# Patient Record
Sex: FEMALE | Race: WHITE
[De-identification: names, ages, dates, MRNs, and addresses within clinical notes are randomized per-mention and may not be internally consistent; named-entity substitution may affect disease eponyms.]

---

## 2021-01-15 ENCOUNTER — HOSPITAL ENCOUNTER (OUTPATIENT)
Dept: HOSPITAL 53 - M SFHCPLAZ | Age: 81
End: 2021-01-15
Attending: NURSE PRACTITIONER
Payer: COMMERCIAL

## 2021-01-15 DIAGNOSIS — E55.9: ICD-10-CM

## 2021-01-15 DIAGNOSIS — E11.9: ICD-10-CM

## 2021-01-15 DIAGNOSIS — Z13.220: ICD-10-CM

## 2021-01-15 DIAGNOSIS — I10: Primary | ICD-10-CM

## 2021-01-15 DIAGNOSIS — E03.9: ICD-10-CM

## 2021-01-15 LAB
25(OH)D3 SERPL-MCNC: 33.5 NG/ML (ref 30–100)
ALBUMIN SERPL BCG-MCNC: 3.2 GM/DL (ref 3.2–5.2)
ALT SERPL W P-5'-P-CCNC: 47 U/L (ref 12–78)
BILIRUB SERPL-MCNC: 0.4 MG/DL (ref 0.2–1)
BUN SERPL-MCNC: 17 MG/DL (ref 7–18)
CALCIUM SERPL-MCNC: 8.9 MG/DL (ref 8.8–10.2)
CHLORIDE SERPL-SCNC: 101 MEQ/L (ref 98–107)
CHOLEST SERPL-MCNC: 195 MG/DL (ref ?–200)
CHOLEST/HDLC SERPL: 3.31 {RATIO} (ref ?–5)
CO2 SERPL-SCNC: 30 MEQ/L (ref 21–32)
CREAT SERPL-MCNC: 0.69 MG/DL (ref 0.55–1.3)
CREAT UR-MCNC: 21.8 MG/DL
EST. AVERAGE GLUCOSE BLD GHB EST-MCNC: 123 MG/DL (ref 60–110)
GFR SERPL CREATININE-BSD FRML MDRD: > 60 ML/MIN/{1.73_M2} (ref 32–?)
GLUCOSE SERPL-MCNC: 99 MG/DL (ref 70–100)
HDLC SERPL-MCNC: 59 MG/DL (ref 40–?)
LDLC SERPL CALC-MCNC: 113 MG/DL (ref ?–100)
MICROALBUMIN UR-MCNC: < 5 MG/L
MICROALBUMIN/CREAT UR: 22.9 MCG/MG (ref 0–30)
NONHDLC SERPL-MCNC: 136 MG/DL
POTASSIUM SERPL-SCNC: 4.1 MEQ/L (ref 3.5–5.1)
PROT SERPL-MCNC: 7.3 GM/DL (ref 6.4–8.2)
SODIUM SERPL-SCNC: 137 MEQ/L (ref 136–145)
TRIGL SERPL-MCNC: 116 MG/DL (ref ?–150)
TSH SERPL DL<=0.005 MIU/L-ACNC: 2.61 UIU/ML (ref 0.36–3.74)

## 2021-10-22 ENCOUNTER — HOSPITAL ENCOUNTER (OUTPATIENT)
Dept: HOSPITAL 53 - M PLAIMG | Age: 81
End: 2021-10-22
Attending: NURSE PRACTITIONER
Payer: COMMERCIAL

## 2021-10-22 DIAGNOSIS — M54.50: Primary | ICD-10-CM

## 2021-10-22 LAB
25(OH)D3 SERPL-MCNC: 40.2 NG/ML (ref 30–100)
ALBUMIN SERPL BCG-MCNC: 3 GM/DL (ref 3.2–5.2)
ALT SERPL W P-5'-P-CCNC: 48 U/L (ref 12–78)
BILIRUB SERPL-MCNC: 0.3 MG/DL (ref 0.2–1)
BUN SERPL-MCNC: 21 MG/DL (ref 7–18)
CALCIUM SERPL-MCNC: 8.8 MG/DL (ref 8.8–10.2)
CHLORIDE SERPL-SCNC: 108 MEQ/L (ref 98–107)
CHOLEST SERPL-MCNC: 169 MG/DL (ref ?–200)
CHOLEST/HDLC SERPL: 2.91 {RATIO} (ref ?–5)
CO2 SERPL-SCNC: 30 MEQ/L (ref 21–32)
CREAT SERPL-MCNC: 0.98 MG/DL (ref 0.55–1.3)
EST. AVERAGE GLUCOSE BLD GHB EST-MCNC: 117 MG/DL (ref 60–110)
GFR SERPL CREATININE-BSD FRML MDRD: 58 ML/MIN/{1.73_M2} (ref 32–?)
GLUCOSE SERPL-MCNC: 115 MG/DL (ref 70–100)
HDLC SERPL-MCNC: 58 MG/DL (ref 40–?)
LDLC SERPL CALC-MCNC: 88 MG/DL (ref ?–100)
NONHDLC SERPL-MCNC: 111 MG/DL
POTASSIUM SERPL-SCNC: 4.2 MEQ/L (ref 3.5–5.1)
PROT SERPL-MCNC: 6.9 GM/DL (ref 6.4–8.2)
SODIUM SERPL-SCNC: 141 MEQ/L (ref 136–145)
TRIGL SERPL-MCNC: 116 MG/DL (ref ?–150)
TSH SERPL DL<=0.005 MIU/L-ACNC: 1.3 UIU/ML (ref 0.36–3.74)

## 2021-10-24 NOTE — REP
INDICATION:

LOW BACK PAIN.



COMPARISON:

None.



TECHNIQUE:

AP and lateral views of the lower thoracic/lumbar spine



FINDINGS:

Advanced degenerative changes at the T12-L1 level including endplate sclerosis, joint

space narrowing and bridging osteophytosis along with hypertrophic facet changes.



Moderate to advanced degenerative changes are also noted throughout the remainder of

the visualized lower thoracic and lumbosacral spine.  Findings include endplate

sclerosis, disc space narrowing, marginal osteophytosis and facet hypertrophy.



No evidence for acute fracture/compression injury.





IMPRESSION:

Moderate to advanced multilevel degenerative spondylosis.





<Electronically signed by Donovan Pascual > 10/24/21 [id number]

## 2021-12-06 ENCOUNTER — HOSPITAL ENCOUNTER (EMERGENCY)
Dept: HOSPITAL 53 - M ED | Age: 81
Discharge: HOME | End: 2021-12-06
Payer: COMMERCIAL

## 2021-12-06 VITALS — HEIGHT: 64 IN | BODY MASS INDEX: 28.53 KG/M2 | WEIGHT: 167.11 LBS

## 2021-12-06 VITALS — DIASTOLIC BLOOD PRESSURE: 100 MMHG | SYSTOLIC BLOOD PRESSURE: 190 MMHG

## 2021-12-06 DIAGNOSIS — K20.90: Primary | ICD-10-CM

## 2021-12-06 DIAGNOSIS — I10: ICD-10-CM

## 2021-12-06 DIAGNOSIS — Z86.79: ICD-10-CM

## 2021-12-06 DIAGNOSIS — Z88.5: ICD-10-CM

## 2021-12-06 DIAGNOSIS — Z82.49: ICD-10-CM

## 2021-12-06 DIAGNOSIS — E11.9: ICD-10-CM

## 2021-12-06 DIAGNOSIS — K21.9: ICD-10-CM

## 2021-12-06 DIAGNOSIS — R07.9: ICD-10-CM

## 2021-12-06 LAB
ALBUMIN SERPL BCG-MCNC: 3.6 GM/DL (ref 3.2–5.2)
ALT SERPL W P-5'-P-CCNC: 45 U/L (ref 12–78)
BASOPHILS # BLD AUTO: 0 10^3/UL (ref 0–0.2)
BASOPHILS NFR BLD AUTO: 0.3 % (ref 0–1)
BILIRUB CONJ SERPL-MCNC: 0.2 MG/DL (ref 0–0.2)
BILIRUB SERPL-MCNC: 0.4 MG/DL (ref 0.2–1)
BUN SERPL-MCNC: 16 MG/DL (ref 7–18)
CALCIUM SERPL-MCNC: 9.6 MG/DL (ref 8.8–10.2)
CHLORIDE SERPL-SCNC: 105 MEQ/L (ref 98–107)
CO2 SERPL-SCNC: 29 MEQ/L (ref 21–32)
CREAT SERPL-MCNC: 0.68 MG/DL (ref 0.55–1.3)
EOSINOPHIL # BLD AUTO: 0 10^3/UL (ref 0–0.5)
EOSINOPHIL NFR BLD AUTO: 0.6 % (ref 0–3)
GFR SERPL CREATININE-BSD FRML MDRD: > 60 ML/MIN/{1.73_M2} (ref 32–?)
GLUCOSE SERPL-MCNC: 106 MG/DL (ref 70–100)
HCT VFR BLD AUTO: 39.7 % (ref 36–47)
HGB BLD-MCNC: 12.8 G/DL (ref 12–15.5)
LIPASE SERPL-CCNC: 259 U/L (ref 73–393)
LYMPHOCYTES # BLD AUTO: 0.7 10^3/UL (ref 1.5–5)
LYMPHOCYTES NFR BLD AUTO: 18.8 % (ref 24–44)
MCH RBC QN AUTO: 30 PG (ref 27–33)
MCHC RBC AUTO-ENTMCNC: 32.2 G/DL (ref 32–36.5)
MCV RBC AUTO: 93.2 FL (ref 80–96)
MONOCYTES # BLD AUTO: 0.4 10^3/UL (ref 0–0.8)
MONOCYTES NFR BLD AUTO: 11 % (ref 2–8)
NEUTROPHILS # BLD AUTO: 2.5 10^3/UL (ref 1.5–8.5)
NEUTROPHILS NFR BLD AUTO: 68.7 % (ref 36–66)
PLATELET # BLD AUTO: 114 10^3/UL (ref 150–450)
POTASSIUM SERPL-SCNC: 3.9 MEQ/L (ref 3.5–5.1)
PROT SERPL-MCNC: 7.9 GM/DL (ref 6.4–8.2)
RBC # BLD AUTO: 4.26 10^6/UL (ref 4–5.4)
RSV RNA NPH QL NAA+PROBE: NEGATIVE
SODIUM SERPL-SCNC: 140 MEQ/L (ref 136–145)
WBC # BLD AUTO: 3.6 10^3/UL (ref 4–10)

## 2021-12-06 PROCEDURE — 71275 CT ANGIOGRAPHY CHEST: CPT

## 2021-12-06 PROCEDURE — 93005 ELECTROCARDIOGRAM TRACING: CPT

## 2021-12-06 PROCEDURE — 87631 RESP VIRUS 3-5 TARGETS: CPT

## 2021-12-06 PROCEDURE — 80076 HEPATIC FUNCTION PANEL: CPT

## 2021-12-06 PROCEDURE — 85025 COMPLETE CBC W/AUTO DIFF WBC: CPT

## 2021-12-06 PROCEDURE — 80048 BASIC METABOLIC PNL TOTAL CA: CPT

## 2021-12-06 PROCEDURE — 71045 X-RAY EXAM CHEST 1 VIEW: CPT

## 2021-12-06 PROCEDURE — 36415 COLL VENOUS BLD VENIPUNCTURE: CPT

## 2021-12-06 PROCEDURE — 99285 EMERGENCY DEPT VISIT HI MDM: CPT

## 2021-12-06 PROCEDURE — 94760 N-INVAS EAR/PLS OXIMETRY 1: CPT

## 2021-12-06 PROCEDURE — 84484 ASSAY OF TROPONIN QUANT: CPT

## 2021-12-06 PROCEDURE — 93041 RHYTHM ECG TRACING: CPT

## 2021-12-06 PROCEDURE — 83690 ASSAY OF LIPASE: CPT

## 2021-12-06 NOTE — CCD
Summarization of Episode Note

                             Created on: 09/10/2021



TAURUS JUNE

External Reference #: [id number]

: 1940

Sex: Female



Demographics





                          Address                   [address]

[place]  [address]

 

                          Home Phone                [phone]

 

                          Preferred Language        Unknown

 

                          Marital Status            Unknown

 

                          Christian Affiliation     Unknown

 

                          Race                      White

 

                          Ethnic Group              Not  or 





Author





                          Author                    [facility]

ems

 

                          Organization              [facility]

ems

 

                          Address                   Unknown

 

                          Phone                     Unavailable







Support





                Name            Relationship    Address         Phone

 

                    TAURUS JUNE                [address]

[place]  [address]                    [phone]

 

                NAZIANOEMY MOLINA  [place], [place]  [address] [phone]







Care Team Providers





                    Care Team Member Name Role                Phone

 

                    Lynda  Katy    Unavailable         [phone]







PROBLEMS





          Type      Condition ICD9-CM Code [facility]70-[facility] Code Onset Dates Condition S

tatus W/U 

Status              Risk                SNOMED Code         Notes

 

       Problem Vitamin D deficiency        E55.9         Active confirmed       

 [id number]  

 

       Problem Headache syndrome        G44.89        Active confirmed        23

[id number]  

 

          Problem   Primary osteoarthritis involving multiple joints           M

89.49              Active    

confirmed                               [id number]            

 

       Problem Essential hypertension        I10           Active confirmed     

   [id number]  

 

       Problem Neuropathy        G62.9         Active confirmed        [id number]

  

 

                          Problem                   Type 2 diabetes mellitus wit

hout complication, without long-term current

use of insulin         E11.9           Active  confirmed         [id number]  

 

       Problem Anxiety        F41.9         Active confirmed        78217354  

 

       Problem Acquired hypothyroidism        E03.9         Active confirmed    

    [id number]  

 

           Problem    Sjogren's syndrome, with unspecified organ involvement    

        M35.00                Active

                confirmed                       [id number]         

 

       Problem Hypothyroidism (acquired)        E03.9         Active confirmed  

      [id number]  

 

       Problem Frequent falls        R29.6         Active confirmed        05159

2002  

 

        Problem Diabetes mellitus type 2 with complications         E11.8       

    Active  confirmed  

                          [id number]                   







ALLERGIES





                    Allergen (clinical drug ingredient) Drug/Non Drug Allergy do

cumented on EMR 

Reaction            Allergy Type        Onset Date          Status

 

                pseudoephedrine Pseudoephedrine HCl(NDC Code:[address]-80) unkn

own         Drug Allergy

                                                    Active







ENCOUNTERS from 1940 to 2021-09-10





             Encounter    Location     Date         Provider     Diagnosis

 

                    [address] [phone] [place] [address] 09 Sep, 2021

                          Katy Howell             







IMMUNIZATIONS





                Vaccine         Route           Administration Date Status

 

                COVID-19 dose #2 given elsewhere Unspecified Unknown         Feb

 22, 2021    Administered

 

                COVID-19 dose #1 given elsewhere Unspecified Unknown         2021    Administered







SOCIAL HISTORY

Tobacco Use:



                    Social History Observation Description         Date

 

                    Details (start date - stop date) Former Smoker        



Sex Assigned At Birth:



                          Social History Observation Description

 

                          Sex Assigned At Birth     Unknown



Education:



                    Question            Answer              Notes

 

                    Level of Education: Finished College     



Language:



                    Question            Answer              Notes

 

                    Languages spoken:                       english



Restoration:



                    Question            Answer              Notes

 

                    Restoration            [address]         



Sexual Hx:



                    Question            Answer              Notes

 

                    Had sex in the last 12 months (vaginal, oral, or anal)? No  

                 

 

                    Have you ever had an STD? No                   



Alcohol Screening:



                    Question            Answer              Notes

 

                    Did you have a drink containing alcohol in the past year? No

                   

 

                    Points              0                    

 

                    Interpretation      Negative             



Tobacco Use:



                    Question            Answer              Notes

 

                    Are you a:          former smoker        

 

                    How long has it been since you last smoked? > 10 years      

     







REASON FOR REFERRAL

No Information



VITAL SIGNS

No information



MEDICATIONS





           Medication SIG (Take, Route, Frequency, Duration) Notes      Start Da

te End Date   

Status

 

           miSOPROStol 100 MCG TAKE 2 TABLETS BY MOUTH  TWICE DAILY for 90      

                            Active

 

                          Omeprazole Magnesium 20 MG 1 tablet 30 minutes before 

morning meal Orally Once a

day for 30 day(s)                                                 Active

 

           Amitriptyline HCl 75 MG 1 tab Orally before bedtime for 90 day(s)    

                              Active

 

           Glucosamine Chondroitin Plus - 1 capsule 1500/1200 Orally daily      

                            Active

 

           Womens One Daily - 1 tab Orally Daily                                

  Active

 

           Metoprolol Succinate ER 50 MG 1 tablet Orally Once a day for 90 days 

                                 

Active

 

                          Calcium 500+D3 500-400 MG-UNIT 1 tablet with a meal Or

ally Once a day for 30 

day(s)                                                          Active

 

                          Synthroid 75 MCG          TAKE 1 TABLET BY MOUTH ONCE 

DAILY IN THE MORNING ON AN  EMPTY 

STOMACH for 90                                                  Active

 

           Sertraline HCl 25 MG TAKE 1 TABLET BY MOUTH ONCE DAILY for 90        

                          Active

 

           ZyrTEC Allergy 10 MG 1 tablet Orally Once a day for 30 day(s)        

                          Active

 

                    busPIRone HCl 10 MG 1 tablet Orally Twice a day prn anxiety 

attack for 30 days  

                    31 Mar, 2021                            Active

 

           AREDS OTC                                              Active

 

           Diclofenac Sodium 75 MG TAKE 1 TABLET BY MOUTH  TWICE DAILY for 90   

                               Active







PROCEDURES

No Information



RESULTS

No Results



REASON FOR VISIT

medication



MEDICAL (GENERAL) HISTORY





                    Type                Description         Date

 

                    Medical History     diabetes mellitus type 2  

 

                    Medical History     essential hypertension  

 

                    Medical History     cirrhosis cryptogenic  

 

                          Medical History           abnormal liver function test

s, related to known history of 

cirrhosis                                

 

                    Medical History     headache syndrome    

 

                    Medical History     hypothyroidism       

 

                    Medical History     sjogrens syndrome    

 

                    Medical History     vitamin D did efficiency  

 

                    Medical History     neuropathy           

 

                    Medical History     anxiety and depression  

 

                    Medical History     colonic diverticulosis  

 

                    Medical History     esophageal reflux    

 

                    Medical History     thrombocytopenia     

 

                    Medical History     DEXA scan 2020  

 

                          Medical History           former smoker, stopped in , smoked for 27 years, 1 pack per 

day                                      

 

                    Surgical History    breast reduction     

 

                    Surgical History    tubal ligation       

 

                    Surgical History    colonoscopy         2017







Goals Section

No Information



Health Concerns

No Information



MEDICAL EQUIPMENT

No Information



MENTAL STATUS

No Information



FUNCTIONAL STATUS

No Information



ASSESSMENTS

No Information



PLAN OF TREATMENT

Medication



                Medication Name Sig             Start Date      Stop Date

 

                          Synthroid 75 MCG          TAKE 1 TABLET BY MOUTH ONCE 

DAILY IN THE MORNING ON AN  EMPTY 

STOMACH for 90                                       

 

                Amitriptyline HCl 75 MG 1 tab Orally before bedtime for 90 day(s

)                  

 

                miSOPROStol 100 MCG TAKE 2 TABLETS BY MOUTH  TWICE DAILY for 90 

                 

 

                Diclofenac Sodium 75 MG TAKE 1 TABLET BY MOUTH  TWICE DAILY for 

90                  

 

                    busPIRone HCl 10 MG 1 tablet Orally Twice a day prn anxiety 

attack for 30 days 

31 Mar, 2021                             

 

                Metoprolol Succinate ER 50 MG 1 tablet Orally Once a day for 90 

days                  

 

                Sertraline HCl 25 MG TAKE 1 TABLET BY MOUTH ONCE DAILY for 90   

               



Next Appt



                                        Details

 

                                        Provider Name:Katy Howell,  01:00:00 PM, [address] [facility], 

[phone], [place], [address], [phone]







Insurance Providers





             Payer Name   Payer Address Payer Phone  Insured Name Patient Relati

onship to 

Insured                   Coverage Start Date       Coverage End Date

 

                [facility] [address] [address]  [facility] [address]

[phone]    

TAURUS JUNE      self

## 2021-12-06 NOTE — CCD
Summarization of Episode Note

                             Created on: 10/04/2021



SLADETAURUS MCCORMICK ELIJAH

External Reference #: [id number]

: 1940

Sex: Female



Demographics





                          Address                   [address]

[place]  [address]

 

                          Home Phone                [phone]

 

                          Preferred Language        Unknown

 

                          Marital Status            Unknown

 

                          Anabaptism Affiliation     Unknown

 

                          Race                      White

 

                          Ethnic Group              Not  or 





Author





                          Author                    [facility]

ems

 

                          Organization              [facility]

ems

 

                          Address                   Unknown

 

                          Phone                     Unavailable







Support





                Name            Relationship    Address         Phone

 

                    TAURUS JUNE       GUAR                [address]

[place]  [address]                    [phone]

 

                [place]  [address]     [phone]







Care Team Providers





                    Care Team Member Name Role                Phone

 

                    Katy Howell    Unavailable         [phone]







PROBLEMS





          Type      Condition ICD9-CM Code IBE77-WD Code Onset Dates Condition S

tatus W/U 

Status              Risk                SNOMED Code         Notes

 

       Problem Vitamin D deficiency        E55.9         Active confirmed       

 [id number]  

 

                          Problem                   Type 2 diabetes mellitus wit

hout complication, without long-term current

use of insulin         E11.9           Active  confirmed         [id number]  

 

       Problem Acquired hypothyroidism        E03.9         Active confirmed    

    [id number]  

 

       Problem Essential hypertension        I10           Active confirmed     

   22710738  

 

       Problem Neuropathy        G62.9         Active confirmed        [id number]

  

 

       Problem Anxiety        F41.9         Active confirmed        12872217  

 

       Problem Headache syndrome        G44.89        Active confirmed        23

8559510  

 

       Problem Abnormal liver function test        R94.5         Active confirme

d        [id number]  

 

        Problem Diabetes mellitus type 2 with complications         E11.8       

    Active  confirmed  

                          [id number]                   

 

       Problem Other chronic pain        G89.29        Active confirmed        8

2558104  

 

       Problem Frequent falls        R29.6         Active confirmed        08137

2002  

 

          Problem   Primary osteoarthritis involving multiple joints           M

89.49              Active    

confirmed                               [id number]            

 

           Problem    Sjogren's syndrome, with unspecified organ involvement    

        M35.00                Active

                confirmed                       [id number]         

 

       Problem Hypothyroidism (acquired)        E03.9         Active confirmed  

      [id number]  

 

       Problem Biliary cirrhosis        K74.5         Active confirmed        17

10464  







ALLERGIES





                    Allergen (clinical drug ingredient) Drug/Non Drug Allergy do

cumented on EMR 

Reaction            Allergy Type        Onset Date          Status

 

                pseudoephedrine Pseudoephedrine HCl(NDC Code:[address]-03) unkn

own         Drug Allergy

                                                    Active







ENCOUNTERS from 1940 to 2021-10-04





             Encounter    Location     Date         Provider     Diagnosis

 

                    [address] [phone] [place] [address] 30 Sep, 2021

                          Katy Howell            Diabetes mellitus type 2 wit

h complications E11.8 ; Medicare 

annual wellness visit, subsequent Z00.00 ; Essential hypertension I10 ; 
Sjogren's syndrome, with unspecified organ involvement M35.00 ; Abnormal liver 
function test R94.5 ; Biliary cirrhosis K74.5 ; Headache syndrome G44.89 ; 
Vitamin D deficiency E55.9 ; Anxiety F41.9 ; Primary osteoarthritis involving 
multiple joints M89.49 ; Frequent falls R29.6 ; Neuropathy G62.9 ; Low back pain
M54.5 ; Acquired hypothyroidism E03.9 ; Change in vision H53.9 ; Screening, 
lipid Z13.220 and Seborrheic dermatitis L21.9







IMMUNIZATIONS





                Vaccine         Route           Administration Date Status

 

                COVID-19 dose #2 given elsewhere Unspecified Unknown         2021    Administered

 

                COVID-19 dose #1 given elsewhere Unspecified Unknown         2021    Administered







SOCIAL HISTORY

Tobacco Use:



                    Social History Observation Description         Date

 

                    Details (start date - stop date) Former Smoker        



Sex Assigned At Birth:



                          Social History Observation Description

 

                          Sex Assigned At Birth     Unknown



Education:



                    Question            Answer              Notes

 

                    Level of Education: Finished College     



Language:



                    Question            Answer              Notes

 

                    Languages spoken:                       english



Lutheran:



                    Question            Answer              Notes

 

                    [employer]            [employer]         



Sexual Hx:



                    Question            Answer              Notes

 

                    Had sex in the last 12 months (vaginal, oral, or anal)? No  

                 

 

                    Have you ever had an STD? No                   



Alcohol Screening:



                    Question            Answer              Notes

 

                    Did you have a drink containing alcohol in the past year? No

                   

 

                    Points              0                    

 

                    Interpretation      Negative             



Tobacco Use:



                    Question            Answer              Notes

 

                    Are you a:          former smoker        

 

                    How long has it been since you last smoked? > 10 years      

     







REASON FOR REFERRAL from 1940 to 2021-10-04





                          Reason                    moved to [facility], [place]  Sjogrens 

 would like to continue to follow with 

Rheumatologist

 

                          Diagnosis 1               Sjogren's syndrome, with uns

pecified organ involvement (M35.00)

 

                          Referral Organization     [facility]

 

                          Referring Provider First Name Katy

 

                          Referring Provider Last Name Lynda

 

                          Referring Provider Specialty Family Medicine

 

                          Referred Organization     [facility]

 

                          Referred Provider         Analilia Layton

 

                          Referred Address          [address] [place] [address],[phone], [place],[address]

 

                          Referred Provider Specialty Rheumatology

 

                          Referral Priority         Routine

 

                          General Notes             Susie Bradley  2021 1:4

5:56 PM > referral faxed







                          Reason                    please eval and  treat imelda nugent in vision requesting Dr. Gomez if possible

 

                          Diagnosis 1               Sjogren's syndrome, with uns

pecified organ involvement (M35.00)

 

                          Diagnosis 2               Change in vision (H53.9)

 

                          Referral Organization     [facility]

 

                          Referring Provider First Name Katy

 

                          Referring Provider Last Name Lynda

 

                          Referring Provider Specialty Family Medicine

 

                          Referred Provider         Seth Lozano

 

                          Referred Provider Specialty Ophthalmology

 

                          Referral Priority         Routine

 

                          General Notes             BradleySurinder silvaar  2021 1:4

6:33 PM > referral faxed







VITAL SIGNS





                    Weight              166 lbs             30 Sep, 

 

                    Weight-kg           75.3 kg             30 Sep, 

 

                    Height              62 in               30 Sep, 

 

                    BMI                 30.36 kg/m2         30 Sep, 2021

 

                    Heart Rate          80 /min             30 Sep, 2021

 

                    Respiratory Rate    18 /min             30 Sep, 2021

 

                    Temperature         97.5 degrees Fahrenheit 30 Sep, 2021

 

                    Oximetry            99%                 30 Sep, 2021

 

                    Blood pressure systolic 134 mm Hg           30 Sep, 2021

 

                    Blood pressure diastolic 72 mm Hg            30 Sep, 2021







MEDICATIONS





           Medication SIG (Take, Route, Frequency, Duration) Notes      Start Da

te End Date   

Status

 

           Womens One Daily - 1 tab Orally Daily                                

  Unknown

 

                    busPIRone HCl 10 MG 1 tablet Orally Twice a day prn anxiety 

attack for 30 days  

                                                            Active

 

           Diclofenac Sodium 75 MG TAKE 1 TABLET BY MOUTH  TWICE DAILY for 90   

                               Unknown

 

           Zoloft 25 MG 1 tablet Orally Once a day for 90 days                  

                Active

 

           miSOPROStol 100 MCG TAKE 2 TABLETS BY MOUTH  TWICE DAILY for 90      

                            Unknown

 

           Sertraline HCl 25 MG TAKE 1 TABLET BY MOUTH ONCE DAILY for 90        

                          Unknown

 

                          Calcium 500+D3 500-400 MG-UNIT 1 tablet with a meal Or

ally Once a day for 30 

day(s)                                                          Unknown

 

           Metoprolol Succinate ER 50 MG 1 tablet Orally Once a day for 90 days 

                                 

Active

 

                          Synthroid 75 MCG          TAKE 1 TABLET BY MOUTH ONCE 

DAILY IN THE MORNING ON AN  EMPTY 

STOMACH for 90                                                  Unknown

 

           ZyrTEC Allergy 10 MG 1 tablet Orally Once a day for 30 day(s)        

                          Unknown

 

           Glucosamine Chondroitin Plus - 1 capsule 1500/1200 Orally daily      

                            Unknown

 

           AREDS OTC                                              Unknown

 

           Amitriptyline HCl 75 MG 1 tab Orally before bedtime                  

                Active

 

                          Omeprazole Magnesium 20 MG 1 tablet 30 minutes before 

morning meal Orally Once a

day for 30 day(s)                                                 Unknown







PROCEDURES

No Information



RESULTS

No Results



REASON FOR VISIT

 2021 DEVEN



MEDICAL (GENERAL) HISTORY





                    Type                Description         Date

 

                    Medical History     diabetes mellitus type 2-diet controlled

  

 

                    Medical History     essential hypertension  

 

                    Medical History     sjogrens syndrome    

 

                    Medical History     cirrhosis cryptogenic  

 

                          Medical History           abnormal liver function test

s, related to known history of 

cirrhosis                                

 

                    Medical History     hypothyroidism       

 

                    Medical History     headache syndrome    

 

                    Medical History     vitamin D did efficiency  

 

                    Medical History     neuropathy           

 

                    Medical History     anxiety and depression  

 

                    Medical History     colonic diverticulosis  

 

                    Medical History     esophageal reflux    

 

                    Medical History     thrombocytopenia     

 

                          Medical History           former smoker, stopped in , smoked for 27 years, 1 pack per 

day                                      

 

                    Medical History     Atrial fib cardioverted successfully 201

7  

 

                    Medical History     Seborrheic dermatitis  

 

                    Surgical History    Dexa Scan [place]   2020

 

                    Surgical History    cardioversion-afib successful 2017

 

                    Surgical History    Nuclear stress test [place] normal per d

ocumentation 2017

 

                    Surgical History    colonoscopy-[place] 2017

 

                    Surgical History    breast reduction     

 

                    Surgical History    tubal ligation       







Goals Section

No Information



Health Concerns

No Information



MEDICAL EQUIPMENT

No Information



MENTAL STATUS

No Information



FUNCTIONAL STATUS

No Information



ASSESSMENTS





             Encounter Date Diagnosis    Assessment Notes Treatment Notes Treatm

ent Clinical 

Notes

 

                30 Sep, 2021    Diabetes mellitus type 2 with complications (ICD

-10 - E11.8)                 



                                        



Last office note from [place] reviewed, hga1c acceptable per doctors 
documentation diet  Janaury  5.9, controlled question if neuropathy is from 
past history, or related to back, or Sjogren's

 

                30 Sep, 2021    Medicare annual wellness visit, subsequent (ICD-

10 - Z00.00)                 



                                        



age appropriate anticipatory guidance given, per USPSTF recommendations; 
immunizations up to date. discussed plans for implementing improvement in 
identified areas

 

                30 Sep, 2021    Essential hypertension (ICD-10 - I10)           

      



                                        



Blood pressure meets goal today remote history of atrial fib with successful 
cardioversion remains on beta-blocker past records indicate he had a nuclear 
stress test 2017 which was normal no other information available appears she 
used to follow with a cardiologist in [place] determined today

 

                          30 Sep, 2021              Sjogren's syndrome, with uns

pecified organ involvement (ICD-10 - 

M35.00)                                             



                                        



States today she used to follow with a rheumatologist in [place] for her 
Sjogren's, discussed with patient and daughter will refer to rheumatology,

 

                30 Sep, 2021    Abnormal liver function test (ICD-10 - R94.5)   

              



                                        



per documentation in old records

 

                30 Sep, 2021    Biliary cirrhosis (ICD-10 - K74.5)              

   



                                        



Prior notes indicate cirrhosis is possible autoimmune versus cryptogenic, NETTIE 
1:8, no ASMA

 

                30 Sep, 2021    Headache syndrome (ICD-10 - G44.89)             

    



                                        



has been on amitriptyline for a few years

 

                30 Sep, 2021    Vitamin D deficiency (ICD-10 - E55.9)           

      



                                        



taking 2 tabs daily does not know what dose.

 

                30 Sep, 2021    Anxiety (ICD-10 - F41.9)                 



                                        



Tolerating Zoloft 25 mg daily. Does not monitor increase in dose if she does she
will call the office. Has leftover Ativan from [place] that she has been using 
for when she has panic attacks discussed that we'll order BuSpar 10 mg by mouth 
twice a day when necessary panic attack risk and benefits of medication 
discussed states she has not used

 

                    30 Sep, 2021        Primary osteoarthritis involving multipl

e joints (ICD-10 - M89.49) 

                                        



                                        



per prior documentation in the old record she brought with her.

 

                30 Sep, 2021    Frequent falls (ICD-10 - R29.6)                 



                                        



offered physical therapy, for balance and strength, pt refused today has gone 
before in [place]

 

                30 Sep, 2021    Neuropathy (ICD-10 - G62.9)                 



                                        



Per prior documentation only states sensory axonal> demyelinating possibly 
related to Sjogren's

 

                30 Sep, 2021    Low back pain (ICD-10 - M54.5)                 



                                        



Questionable arthritis there are no prior documentation to assist, will obtain 
an x-ray for initial review, refer

 

                30 Sep, 2021    Acquired hypothyroidism (ICD-10 - E03.9)        

         



                                        



Remains on replacement

 

                30 Sep, 2021    Change in vision (ICD-10 - H53.9)               

  



                                        



Requesting referral for eye examination due to change in vision

 

                30 Sep, 2021    Screening, lipid (ICD-10 - Z13.220)             

    



                                        





 

                30 Sep, 2021    Seborrheic dermatitis (ICD-10 - L21.9)          

       



                                        



Used to use Plexion wash, ketoconazole,elocon







PLAN OF TREATMENT

Medication



                Medication Name Sig             Start Date      Stop Date

 

                    busPIRone HCl 10 MG 1 tablet Orally Twice a day prn anxiety 

attack for 30 days  

                                         

 

                Zoloft 25 MG    1 tablet Orally Once a day for 90 days          

        

 

                Amitriptyline HCl 75 MG 1 tab Orally before bedtime             

     

 

                Metoprolol Succinate ER 50 MG 1 tablet Orally Once a day for 90 

days                  



Treatment Notes



                    Assessment          Notes               Clinical Notes

 

                    Acquired hypothyroidism                     Remains on repla

cement

 

                    Diabetes mellitus type 2 with complications                 

    Last office note from [place] 

reviewed, hga1c acceptable per doctors documentation diet  2021 5.9, 
controlled question if neuropathy is from past history, or related to back, or 
Sjogren's

 

                    Low back pain                           Questionable arthrit

is there are no prior documentation to 

assist, will obtain an x-ray for initial review, refer

 

                    Medicare annual wellness visit, subsequent                  

   age appropriate anticipatory 

guidance given, per USPSTF recommendations; immunizations up to date. discussed 
plans for implementing improvement in identified areas

 

                    Seborrheic dermatitis                     Used to use Plexio

n wash, ketoconazole,elocon

 

                    Essential hypertension                     Blood pressure me

ets goal today remote history of 

atrial fib with successful cardioversion remains on beta-blocker past records 
indicate he had a nuclear stress test 2017 which was normal no other in
formation available appears she used to follow with a cardiologist in [place] 
determined today

 

                    Change in vision                        Requesting referral 

for eye examination due to change in 

vision

 

                    Sjogren's syndrome, with unspecified organ involvement      

               States today she used 

to follow with a rheumatologist in [place] for her Sjogren's, discussed with 
patient and daughter will refer to rheumatology,

 

                    Abnormal liver function test                     per [place] in old records

 

                    Biliary cirrhosis                       Prior notes indicate

 cirrhosis is possible autoimmune versus

cryptogenic, NETTIE 1:8, no ASMA

 

                    Headache syndrome                       has been on amitript

yline for a few years

 

                    Neuropathy                              Per prior documentat

ion only states sensory axonal> demyelinating 

possibly related to Sjogren's

 

                    Frequent falls                          offered physical the

rapy, for balance and strength, pt refused 

today has gone before in [place]

 

                    Vitamin D deficiency                     taking 2 tabs daily

 does not know what dose.

 

                    Anxiety                                 Tolerating Zoloft 25

 mg daily. Does not monitor increase in dose if 

she does she will call the office. Has leftover Ativan from [place] that she has
been using for when she has panic attacks discussed that we'll order BuSpar 10 
mg by mouth twice a day when necessary panic attack risk and benefits of 
medication discussed states she has not used

 

                    Primary osteoarthritis involving multiple joints            

         per prior documentation in 

the old record she brought with her.



Treatment Notes



                          Test Name                 Order Date

 

                          VITAMIN D 25-HYDROXY      2021

 

                          Comprehensive Metabolic Profile (CMP) 2021

 

                          TSH                       2021

 

                          LIPID PANEL (CARDIAC RISK) 2021

 

                          [facility] Spine, THORACOLUMBAR 2 VIEW 2021

 

                          HEMOGLOBIN A1c            2021



Referrals



                          Referral Date             Details

 

                                                    moved to [facility], [place]  Sjogr 

 would like to continue to follow with 

Rheumatologist, Analilia Layton, [address] [place] [address] [place], [address], [phone]

 

                                                    please eval and  treat imelda nugent in vision requesting Dr. Gomez if possible, 

Seth Lozano



Next Appt



                                        Details

 

                                        6 months dm2 visit Reason:

 

                                        Provider Name:Ktay Howell,  01:30:00 PM, [address] [facility], 

[phone], [place], [address], [phone]







Insurance Providers





             Payer Name   Payer Address Payer Phone  Insured Name Patient Relati

onship to 

Insured                   Coverage Start Date       Coverage End Date

 

                [facility] [address] [address]  [facility] [id number]

5 [phone]    

TAURUS JUNE      self

## 2021-12-06 NOTE — CCD
Summarization Of Episode

                             Created on: 2021



TAURUS JUNE ELIJAH

External Reference #: [id number]

: 1940

Sex: Undifferentiated



Demographics





                          Address                   [address]

[place]  [address]

 

                          Home Phone                [phone]

 

                          Preferred Language        Unknown

 

                          Marital Status            Unknown

 

                          Confucianist Affiliation     Unknown

 

                          Race                      Unknown

 

                          Ethnic Group              Not  or 





Author





                          Author                    [facility]

 

                          Organization              [facility]

 

                          Address                   Unknown

 

                          Phone                     Unavailable







Support





                Name            Relationship    Address         Phone

 

                RETIRED         Next Of Kin     Unknown         Unavailable

 

                    NOEMY ADEN     Next Of Kin         [address]

[place]

[place]  [address]                    [phone]

 

                    Noemy Aden     ECON                [address] [place]  [address]                    [phone]



                                  



Re-disclosure Warning

          The records that you are about to access may contain information from 
federally-assisted alcohol or drug abuse programs. If such information is 
present, then the following federally mandated warning applies: This information
has been disclosed to you from records protected by federal confidentiality 
rules (42 CFR part 2). The federal rules prohibit you from making any further 
disclosure of this information unless further disclosure is expressly permitted 
by the written consent of the person to whom it pertains or as otherwise 
permitted by 42 CFR part 2. A general authorization for the release of medical 
or other information is NOT sufficient for this purpose. The Federal rules 
restrict any use of the information to criminally investigate or prosecute any 
alcohol or drug abuse patient.The records that you are about to access may 
contain highly sensitive health information, the redisclosure of which is 
protected by Article 27-F of the [facility] Public Health law. If you 
continue you may have access to information: Regarding HIV / AIDS; Provided by 
facilities licensed or operated by the [facility] Office of Mental Health; 
or Provided by the [facility] Office for People With Developmental 
Disabilities. If such information is present, then the following [place] 
mandated warning applies: This information has been disclosed to you from 
confidential records which are protected by state law. State law prohibits you 
from making any further disclosure of this information without the specific 
written consent of the person to whom it pertains, or as otherwise permitted by 
law. Any unauthorized further disclosure in violation of state law may result in
a fine or assisted sentence or both. A general authorization for the release of 
medical or other information is NOT sufficient authorization for further disc
losure.                                                                         
    



Encounters

          



           Encounter  Providers  Location   Date       Indications Data Source(s

)

 

                    Office Visit, Est Pt., Level 3 PC                     [address]

[place] [address]

                    2021 12:00:00 AM EDT                     eCW1 ([facility])

 

                Unknown                         [address] [facility] [address] 2021 12:00:00 AM 

EDT                                                 eCW1 ([facility])

 

                Unknown                         [address] [facility], 

[place] [address] 2021 12:00:00 AM 

EDT                                                 eCW1 ([facility])

 

                Outpatient                      [address] [facility] [address] 2021 12:00:00 AM

EDT                                                 eCW1 ([facility])

 

                Outpatient                      [address] [facility] [address] 2021 12:00:00 AM

EST                                                 eCW1 ([facility])



                                                                                
                                               



Immunizations

          



             Vaccine      Date         Status       Description  Data Source(s)

 

             COVID-19 VACCINE Moderna 10/22/2021 12:00:00 AM EDT completed      

           [facility]

 

                                        Vaccine Series Complete: YESThis Data wa

s Submitted to [facility] Via [employer]. 

 

                    COVID-19 dose #2 given elsewhere Unspecified 2021 01:2

8:00 PM EST 

completed                                           eCW1 ([facility])

 

                    COVID-19 dose #2 given elsewhere Unspecified 2021 01:2

8:00 PM EST 

completed                                           eCW1 ([facility])

 

                    COVID-19 dose #2 given elsewhere Unspecified 2021 01:2

8:00 PM EST 

completed                                           eCW1 ([facility])

 

                    COVID-19 dose #2 given elsewhere Unspecified 2021 01:2

8:00 PM EST 

completed                                           eCW1 ([facility])

 

             COVID-19 VACCINE Moderna 2021 12:00:00 AM EST completed      

           [facility]

 

                                        Vaccine Series Complete: YESThis Data wa

s Submitted to [facility] [employer]. 

 

                    COVID-19 dose #1 given elsewhere Unspecified 2021 01:2

8:00 PM EST 

completed                                           eCW1 ([facility])

 

                    COVID-19 dose #1 given elsewhere Unspecified 2021 01:2

8:00 PM EST 

completed                                           eCW1 ([facility])

 

                    COVID-19 dose #1 given elsewhere Unspecified 2021 01:2

8:00 PM EST 

completed                                           eCW1 ([facility])

 

                    COVID-19 dose #1 given elsewhere Unspecified 2021 01:2

8:00 PM EST 

completed                                           eCW1 ([facility])

 

             COVID-19 VACCINE Moderna 2021 12:00:00 AM EST completed      

           [facility]

 

                                        Vaccine Series Complete: NOThis Data was

 Submitted to [facility] [employer]. 

 

                          INFLUENZA VIRUS VACCINE QUADRIVAL SPLIT -(65 YR 

UP)/PF 10/20/2020 12:00:00

AM EDT              completed                               [facility]



                                                                                
                                                                                
                                    



Medications

          



          Medication Brand Name Start Date Product Form Dose      Route     Admi

nistrative 

Instructions Pharmacy Instructions Status     Indications Reaction   Description

 Data 

Source(s)

 

          100 mcg/0.5 mL           10/22/2021 12:00:00 AM EDT suspension 0      

             INJECT AS DIRECTED 

(THIRD DOSE) INJECT AS DIRECTED (THIRD DOSE) SOLD: 10/22/2021                   

               [facility]

 

        240 mcg/0.7 mL         10/22/2021 12:00:00 AM EDT syringe 0             

  INJECT AS DIRECTED 

INJECT AS DIRECTED SOLD: 10/22/2021                                        [facility]

 

                          buspirone hydrochloride 10 MG Oral Tablet busPIRone HC

l 10 MG busPIRone HCl 10 

MG     2021 12:00:00 AM EDT        1.0 {tablet}                      activ

e               busPIRone HCl 10 

MG                                      eCW1 ([facility])

 

                          buspirone hydrochloride 10 MG Oral Tablet busPIRone HC

l 10 MG busPIRone HCl 10 

MG     2021 12:00:00 AM EDT        1.0 {tablet}                      activ

e               busPIRone HCl 10 

MG                                      eCW1 ([facility])

 

                          buspirone hydrochloride 10 MG Oral Tablet BusPIRone HC

l 10 MG BusPIRone HCl 10 

MG     2021 12:00:00 AM EDT        1.0 {tablet}                      activ

e               BusPIRone HCl 10 

MG                                      eCW1 ([facility])

 

                    Sertraline 25 MG Oral Tablet [Zoloft] Zoloft 25 MG Zoloft 25

 MG        2021 

12:00:00 AM EST        1.0 {tablet}                      active               Zo

loft 25 MG eCW1 ([facility])

 

                          Levothyroxine Sodium 0.075 MG Oral Tablet [Synthroid] 

Synthroid 75 MCG Synthroid

 75 MCG 2021 12:00:00 AM EST                               active         

    Synthroid 75 MCG eCW1 

([facility])

 

                    Diclofenac Sodium 75 MG Delayed Release Oral Tablet Diclofen

ac Sodium 75 MG 

2020 12:00:00 AM EST         1.0 {tablet}                         active  

                Diclofenac Sodium 75 

MG                                      eCW1 ([facility])

 

                    Misoprostol 0.1 MG Oral Tablet [Cytotec] Cytotec 100 MCG Cyt

otec 100 MCG     

2020 12:00:00 AM EST                                    active            

   Cytotec 100 MCG eCW1 ([facility])

 

                    Misoprostol 0.1 MG Oral Tablet [Cytotec] Cytotec 100 MCG Cyt

otec 100 MCG     

2020 12:00:00 AM EST                                    active            

   Cytotec 100 MCG eCW1 ([facility])

 

                    Misoprostol 0.1 MG Oral Tablet [Cytotec] Cytotec 100 MCG Cyt

otec 100 MCG     

2020 12:00:00 AM EST                                    active            

   Cytotec 100 MCG eCW1 ([facility])



                                                                                
                                                                                
        



Insurance Providers

          



             Payer name   Policy type / Coverage type Policy ID    Covered party

 ID Covered 

party's relationship to [employer]gan Policy [employer]             Plan Information

 

          [facility]           [id number]                          

   [id number]



                                                                                
        



Problems, Conditions, and Diagnoses

          



           Code       Display Name Description Problem Type Effective Dates Data

 Source(s)

 

           K74.5      [id number]    Biliary cirrhosis Problem    10/02/2021 12:00:0

0 AM EDT eCW1 

([facility])

 

           R94.5      [id number]  Abnormal liver function test Problem    10/02/2

021 12:00:00 AM EDT 

eCW1 ([facility])

 

           G89.29     [id number]   Other chronic pain Problem    2021 12:00:

00 AM EDT eCW1 

([facility])

 

           E03.9      [id number]  Hypothyroidism (acquired) Problem    2021

 12:00:00 AM EST 

eCW1 ([facility])

 

             M35.00       [id number]     Sjogren's syndrome, with unspecified orga

n involvement Problem      

2021 12:00:00 AM EST              eCW1 ([facility])

 

             M89.49       [id number]    Primary osteoarthritis involving multiple

 joints Problem      

2021 12:00:00 AM EST              eCW1 ([facility])

 

           R29.6      [id number]  Frequent falls Problem    2021 12:00:00 A

M EST eCW1 

([facility])

 

             E11.8        [id number]     Diabetes mellitus type 2 with complicatio

ns Problem      2021 

12:00:00 AM EST                         eCW1 ([facility])

 

           G44.89     [id number]  Headache syndrome Problem    2021 12:00:0

0 AM EST eCW1 

([facility])

 

           F41.9      [id number]   Anxiety    Problem    2021 12:00:00 AM ES

T eCW1 ([facility])

 

           G62.9      [id number]  Neuropathy Problem    2021 12:00:00 AM ES

T eCW1 ([facility])

 

           I10        40652910   Essential hypertension Problem    2020 12

:00:00 AM EST eCW1 

([facility])

 

           E03.9      [id number]  Acquired hypothyroidism Problem    2020 1

2:00:00 AM EST eCW1 

([facility])

 

                    E11.9               [id number]           Type 2 diabetes roxana

itus without complication, without long-term

 current use of insulin Problem             2020 12:00:00 AM EST eCW1 ([facility])

 

           E55.9      [id number]   Vitamin D deficiency Problem    2020 12:0

0:00 AM EST eCW1 

([facility])



                                                                                
                                                                                
                                                                              



Surgeries/Procedures

          No Information                                                        
                      



Results

          No Information                                                        
                      



Social History

          



           Code       Duration   Value      Status     Description Data Source(s

)

 

           Smoking    10/02/2021 12:00:00 AM EDT Former Smoker completed  Former

 Smoker eCW1 

([facility])

 

           Smoking    2021 12:00:00 AM EDT Former Smoker completed  Former

 Smoker eCW1 

([facility])

 

           Smoking    2021 12:00:00 AM EDT Former Smoker completed  Former

 Smoker eCW1 

([facility])

 

           Smoking    2021 12:00:00 AM EDT Former Smoker completed  Former

 Smoker eCW1 

([facility])

 

           Smoking    2021 12:00:00 AM EST Former Smoker completed  Former

 Smoker eCW1 

([facility])



                                                                                
                                                          



Vital Signs

          



                    ID                  Date                Data Source

 

                    UNK                                      









           Name       Value      Range      Interpretation Code Description Data

 Source(s)

 

           Body weight 166 [lb_av]                       166 [lb_av] eCW1 ([facility])

 

           Body weight 75.3 kg                          75.3 kg    eCW1 ([facility])

 

           Body height 62 [in_i]                        62 [in_i]  eCW1 ([facility])

 

           Body mass index (BMI) [Ratio] 30.36 kg/m2                       30.36

 kg/m2 eCW1 ([facility])

 

           Heart rate 80 /min                          80 /min    eCW1 ([facility])

 

           Respiratory rate 18 /min                          18 /min    eCW1 ([facility])

 

           Body temperature 97.5 [degF]                       97.5 [degF] eCW1 (

[facility])

 

           Systolic blood pressure 134 mm[Hg]                       134 mm[Hg] e

CW1 ([facility])

 

           Diastolic blood pressure 72 mm[Hg]                        72 mm[Hg]  

eCW1 ([facility])

 

           Body weight 173.2 [lb_av]                       173.2 [lb_av] eCW1 ([facility]placed by [facility])

 

           Body height 62 [in_i]                        62 [in_i]  eCW1 ([facility])

 

           Body mass index (BMI) [Ratio] 31.68 kg/m2                       31.68

 kg/m2 eCW1 ([facility])

 

           Heart rate 72 /min                          72 /min    eCW1 ([facility])

 

           Respiratory rate 18 /min                          18 /min    eCW1 ([facility])

 

           Body temperature 97.7 [degF]                       97.7 [degF] eCW1 (

[facility])

 

           Systolic blood pressure 140 mm[Hg]                       140 mm[Hg] e

CW1 ([facility])

 

           Diastolic blood pressure 72 mm[Hg]                        72 mm[Hg]  

eCW1 ([facility])

 

           Body weight 171 [lb_av]                       171 [lb_av] eCW1 ([facility])

 

           Body height 62 [in_i]                        62 [in_i]  eCW1 ([facility])

 

           Body mass index (BMI) [Ratio] 31.27 kg/m2                       31.27

 kg/m2 eCW1 ([facility])

 

           Heart rate 76 /min                          76 /min    eCW1 ([facility])

 

           Respiratory rate 18 /min                          18 /min    eCW1 ([facility])

 

           Body temperature 98.6 [degF]                       98.6 [degF] eCW1 (

[facility])

 

           Systolic blood pressure 160 mm[Hg]                       160 mm[Hg] e

CW1 ([facility])

 

           Diastolic blood pressure 74 mm[Hg]                        74 mm[Hg]  

eCW1 ([facility])



                                                                                
                  



Patient Treatment Plan of Care

          



             Planned Activity Planned Date Details      Description  Data Source

(s)

 

             buspirone hydrochloride 10 MG Oral Tablet 2021 12:00:00 AM ED

T                           eCW1 

([facility])

 

             buspirone hydrochloride 10 MG Oral Tablet 2021 12:00:00 AM ED

T                           eCW1 

([facility])

 

             buspirone hydrochloride 10 MG Oral Tablet 2021 12:00:00 AM ED

T                           eCW1 

([facility])

 

             Sertraline 25 MG Oral Tablet [Zoloft] 2021 12:00:00 AM EST   

                        eCW1 

([facility])

 

                          Levothyroxine Sodium 0.075 MG Oral Tablet [Synthroid] 

2021 12:00:00 AM EST

                                                            eCW1 ([facility])

## 2021-12-06 NOTE — REP
INDICATION:

r/o left PE.



COMPARISON:

Chest radiograph today.



TECHNIQUE:

CT angiogram chest performed following the intravenous administration of 100 cc of

Isovue 370.  Sagittal and coronal reconstruction images are performed.



FINDINGS:

Lungs: There are mild bibasilar fibro atelectatic changes.

Mediastinum: No adenopathy.

Pulmonary arteries: No evidence of pulmonary embolism.

Dana: No adenopathy.

Axilla: No adenopathy.

Pleura: No effusion.

Heart: Not enlarged.

Thoracic aorta: No aneurysm or dissection.

Upper abdominal structures: There is a small hiatal hernia.  There is questionable

thickening of the mid to distal esophagus diffusely..

Visualized osseous structures: There are degenerative changes of the spine without

compression fracture.



IMPRESSION:

No CT evidence of pulmonary embolism.   No infiltrate seen.  Questionable thickening

of mid to distal esophagus, possible esophagitis.





<Electronically signed by Levi Choi > 12/06/21 [id number]

## 2021-12-06 NOTE — REP
INDICATION:

CHEST PAIN.



COMPARISON:

None.



TECHNIQUE:

Single portable AP view of the chest was performed.



FINDINGS:

There is no acute infiltrate or pulmonary edema.  Lungs are clear.  The heart is not

significantly enlarged.  The mediastinal silhouette is unremarkable.  The visualized

osseous structures are intact.There is mild elevation of the right hemidiaphragm.



IMPRESSION:

No acute pulmonary disease.





<Electronically signed by Levi Choi > 12/06/21 [id number]

## 2021-12-31 ENCOUNTER — HOSPITAL ENCOUNTER (OUTPATIENT)
Dept: HOSPITAL 53 - M LABSMTC | Age: 81
End: 2021-12-31
Attending: ANESTHESIOLOGY
Payer: COMMERCIAL

## 2021-12-31 DIAGNOSIS — Z11.52: ICD-10-CM

## 2021-12-31 DIAGNOSIS — Z01.818: Primary | ICD-10-CM

## 2022-01-05 ENCOUNTER — HOSPITAL ENCOUNTER (OUTPATIENT)
Dept: HOSPITAL 53 - M OPP | Age: 82
Discharge: HOME | End: 2022-01-05
Attending: SURGERY
Payer: COMMERCIAL

## 2022-01-05 VITALS — BODY MASS INDEX: 28.51 KG/M2 | HEIGHT: 64 IN | WEIGHT: 167 LBS

## 2022-01-05 VITALS — DIASTOLIC BLOOD PRESSURE: 88 MMHG | SYSTOLIC BLOOD PRESSURE: 197 MMHG

## 2022-01-05 DIAGNOSIS — Z88.8: ICD-10-CM

## 2022-01-05 DIAGNOSIS — K29.70: Primary | ICD-10-CM

## 2022-01-05 DIAGNOSIS — Z87.891: ICD-10-CM

## 2022-01-05 DIAGNOSIS — Z79.899: ICD-10-CM

## 2022-01-05 DIAGNOSIS — Z79.891: ICD-10-CM

## 2022-01-05 DIAGNOSIS — R93.3: ICD-10-CM

## 2022-10-02 ENCOUNTER — HOSPITAL ENCOUNTER (EMERGENCY)
Dept: HOSPITAL 53 - M ED | Age: 82
Discharge: HOME | End: 2022-10-02
Payer: COMMERCIAL

## 2022-10-02 VITALS — SYSTOLIC BLOOD PRESSURE: 168 MMHG | DIASTOLIC BLOOD PRESSURE: 78 MMHG

## 2022-10-02 VITALS — WEIGHT: 160.34 LBS | HEIGHT: 64 IN | BODY MASS INDEX: 27.37 KG/M2

## 2022-10-02 DIAGNOSIS — F03.90: ICD-10-CM
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
## 2022-10-02 DIAGNOSIS — Z79.811: ICD-10-CM
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
## 2022-10-02 DIAGNOSIS — Z79.4: ICD-10-CM

## 2022-10-02 DIAGNOSIS — F41.9: ICD-10-CM

## 2022-10-02 DIAGNOSIS — I10: ICD-10-CM

## 2022-10-02 DIAGNOSIS — Z79.899: ICD-10-CM

## 2022-10-02 DIAGNOSIS — S22.42XA: Primary | ICD-10-CM

## 2022-10-02 DIAGNOSIS — Z88.8: ICD-10-CM
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
## 2022-10-02 DIAGNOSIS — E11.9: ICD-10-CM

## 2023-10-30 ENCOUNTER — HOSPITAL ENCOUNTER (OUTPATIENT)
Dept: HOSPITAL 53 - M PLALAB | Age: 83
End: 2023-10-30
Attending: NURSE PRACTITIONER
Payer: COMMERCIAL

## 2023-10-30 DIAGNOSIS — Z09: Primary | ICD-10-CM

## 2023-10-30 DIAGNOSIS — Z86.16: ICD-10-CM

## 2023-10-30 LAB
BUN SERPL-MCNC: 24 MG/DL (ref 9–23)
CALCIUM SERPL-MCNC: 9.5 MG/DL (ref 8.3–10.6)
CHLORIDE SERPL-SCNC: 105 MMOL/L (ref 98–107)
CO2 SERPL-SCNC: 29 MMOL/L (ref 20–31)
CREAT SERPL-MCNC: 0.81 MG/DL (ref 0.55–1.3)
GFR SERPL CREATININE-BSD FRML MDRD: > 60 ML/MIN/{1.73_M2} (ref 32–?)
GLUCOSE SERPL-MCNC: 112 MG/DL (ref 74–106)
HCT VFR BLD AUTO: 36.7 % (ref 36–47)
HGB BLD-MCNC: 11.7 G/DL (ref 12–15.5)
MCH RBC QN AUTO: 30.2 PG (ref 27–33)
MCHC RBC AUTO-ENTMCNC: 31.9 G/DL (ref 32–36.5)
MCV RBC AUTO: 94.6 FL (ref 80–96)
PLATELET # BLD AUTO: 113 10^3/UL (ref 150–450)
POTASSIUM SERPL-SCNC: 4.6 MMOL/L (ref 3.5–5.1)
RBC # BLD AUTO: 3.88 10^6/UL (ref 4–5.4)
SODIUM SERPL-SCNC: 143 MMOL/L (ref 136–145)
WBC # BLD AUTO: 3.8 10^3/UL (ref 4–10)